# Patient Record
Sex: MALE | Race: WHITE | NOT HISPANIC OR LATINO | Employment: FULL TIME | URBAN - METROPOLITAN AREA
[De-identification: names, ages, dates, MRNs, and addresses within clinical notes are randomized per-mention and may not be internally consistent; named-entity substitution may affect disease eponyms.]

---

## 2024-05-28 ENCOUNTER — HOSPITAL ENCOUNTER (OUTPATIENT)
Dept: RADIOLOGY | Facility: HOSPITAL | Age: 54
Discharge: HOME | End: 2024-05-28
Attending: ORTHOPAEDIC SURGERY
Payer: COMMERCIAL

## 2024-05-28 VITALS
WEIGHT: 214.6 LBS | HEART RATE: 61 BPM | BODY MASS INDEX: 31.78 KG/M2 | SYSTOLIC BLOOD PRESSURE: 112 MMHG | DIASTOLIC BLOOD PRESSURE: 76 MMHG | HEIGHT: 69 IN

## 2024-05-28 DIAGNOSIS — M25.552 LEFT HIP PAIN: Primary | ICD-10-CM

## 2024-05-28 DIAGNOSIS — M25.552 LEFT HIP PAIN: ICD-10-CM

## 2024-05-28 DIAGNOSIS — M16.12 PRIMARY OSTEOARTHRITIS OF ONE HIP, LEFT: ICD-10-CM

## 2024-05-28 PROCEDURE — 99203 OFFICE O/P NEW LOW 30 MIN: CPT | Performed by: ORTHOPAEDIC SURGERY

## 2024-05-28 PROCEDURE — 73502 X-RAY EXAM HIP UNI 2-3 VIEWS: CPT

## 2024-05-28 RX ORDER — MELOXICAM 15 MG/1
15 TABLET ORAL DAILY
COMMUNITY
Start: 2024-01-15 | End: 2025-01-14

## 2024-05-28 RX ORDER — LEVOTHYROXINE SODIUM 175 UG/1
175 TABLET ORAL DAILY
COMMUNITY

## 2024-05-28 RX ORDER — METOPROLOL SUCCINATE 50 MG/1
TABLET, EXTENDED RELEASE ORAL
COMMUNITY
Start: 2024-04-02

## 2024-05-28 RX ORDER — ATORVASTATIN CALCIUM 10 MG/1
10 TABLET, FILM COATED ORAL
COMMUNITY
Start: 2024-01-09

## 2024-05-28 NOTE — PROGRESS NOTES
Assessment:   Diagnosis ICD-10-CM Associated Orders   1. Left hip pain  M25.552 XR hip/pelv 2-3 vws left if performed     FL injection left hip (non arthrogram)     Ambulatory referral to [facility] Pain [facility]      2. Primary osteoarthritis of one hip, left  M16.12 FL injection left hip (non arthrogram)     Ambulatory referral to Sp[facility] Pain [facility]          Plan:  A discussion was had with the patient that a god option for his hip pain may be to try a steroid injection.  However, this cannot be done in the office due to important neurovascular structures around the hip, so we will refer him for one.  After the injection, he should monitor the relief he gets.  If it helps, we know the pain is from his hip joint and a hip replacement would help his symptoms.  We could also do hip injections indefinitely for as long as they work well for him.    To do next visit:  Follow-up after the hip CSI.    The above stated was discussed in layman's terms and the patient expressed understanding.  All questions were answered to the patient's satisfaction.         Scribe Attestation      I,:  Chantell Mai PA-C am acting as a scribe while in the presence of the attending physician.:       I,:  Saleem Perdeu MD personally performed the services described in this documentation    as scribed in my presence.:             Subjective:   Dionicio Saravia is a 53 y.o. male who presents to the office today as a new patient for evaluation of left hip pain.  He has had hip pain for many years.  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  Sometimes the pain prevents him from sleeping.  He has had an MRI in the past in 2019 and was diagnosed with a small labral tear.  He saw a provider at [facility] who recommneded Meloxicam for 6 months, and now patient is looking for a second opinion.  He is fairly active.      Review of systems negative unless otherwise specified in  "HPI    History reviewed. No pertinent past medical history.    History reviewed. No pertinent surgical history.    History reviewed. No pertinent family history.    Social History     Occupational History    Not on file   Tobacco Use    Smoking status: Never    Smokeless tobacco: Never   Substance and Sexual Activity    Alcohol use: Not on file    Drug use: Not on file    Sexual activity: Not on file         Current Outpatient Medications:     atorvastatin (LIPITOR) 10 mg tablet, Take 10 mg by mouth, Disp: , Rfl:     levothyroxine 175 mcg tablet, Take 175 mcg by mouth daily, Disp: , Rfl:     meloxicam (MOBIC) 15 mg tablet, Take 15 mg by mouth daily, Disp: , Rfl:     metoprolol succinate (TOPROL-XL) 50 mg 24 hr tablet, TAKE 1 TABLET ONCE A DAY ALTERNATE WITH HALF TAB EVERY OTHER DAY, Disp: , Rfl:     No Known Allergies         Vitals:    05/28/24 0926   BP: 112/76   Pulse: 61       Objective:    General:  Patient is WDWN, alert and oriented, appears stated age, and is in no acute distress.    Musculoskeletal:    Left Hip:    Inspection:  No visible abnormality.    Range of Motion:  Left Hip ROM:  25 IR, 20 ER  Right Hip ROM:  40 IR, 50 ER   Normal hip flexion bilaterally        Diagnostics, reviewed and taken today if performed as documented:    The attending physician has personally reviewed the pertinent films in PACS and interpretation is as follows:  Left Hip X-rays:  The patient has moderate OA with subchondral cysts and bone spurring.  Acetabular cysts noted.  His femoral head is not perfectly spherical.      Procedures, if performed today:    None performed      Portions of the record may have been created with voice recognition software.  Occasional wrong word or \"sound a like\" substitutions may have occurred due to the inherent limitations of voice recognition software.  Read the chart carefully and recognize, using context, where substitutions have occurred.  "

## 2024-06-17 ENCOUNTER — CONSULT (OUTPATIENT)
Dept: PAIN MEDICINE | Facility: CLINIC | Age: 54
End: 2024-06-17
Payer: COMMERCIAL

## 2024-06-17 VITALS
BODY MASS INDEX: 30.96 KG/M2 | SYSTOLIC BLOOD PRESSURE: 176 MMHG | DIASTOLIC BLOOD PRESSURE: 94 MMHG | WEIGHT: 209 LBS | HEIGHT: 69 IN | HEART RATE: 73 BPM

## 2024-06-17 DIAGNOSIS — M16.12 PRIMARY OSTEOARTHRITIS OF ONE HIP, LEFT: Primary | ICD-10-CM

## 2024-06-17 DIAGNOSIS — M25.552 LEFT HIP PAIN: ICD-10-CM

## 2024-06-17 PROCEDURE — 99244 OFF/OP CNSLTJ NEW/EST MOD 40: CPT | Performed by: ANESTHESIOLOGY

## 2024-06-17 NOTE — PROGRESS NOTES
Assessment  1. Left hip pain    2. Primary osteoarthritis of one hip, left        Plan  53-year-old male, referred by Dr. Perdue of orthopedics, presenting for initial consultation regarding left hip and groin pain for the past 10 years which has progressively worsened.  X-ray of the left hip demonstrates OA.  He has done physical therapy in the past without much relief, however has found relief with chiropractic treatment.  However, this is transient.  He does take Tylenol and ibuprofen as needed with some relief.  Meloxicam was less effective than ibuprofen.    1.  I will schedule the patient for left intra-articular hip injection under fluoroscopic guidance  2.  Patient may take ibuprofen 600 mg every 8 hours as needed  3.  Patient may take Tylenol 500 to 1000 mg every 8 hours as needed  4.  Patient may continue with chiropractic treatment  5.  I will follow-up with the patient in 4 weeks      Complete risks and benefits including bleeding, infection, tissue reaction, nerve injury and allergic reaction were discussed. The approach was demonstrated using models and literature was provided. Verbal and written consent was obtained.    My impressions and treatment recommendations were discussed in detail with the patient who verbalized understanding and had no further questions.  Discharge instructions were provided. I personally saw and examined the patient and I agree with the above discussed plan of care.    No orders of the defined types were placed in this encounter.    No orders of the defined types were placed in this encounter.      History of Present Illness    Dionicio Saravia is a 53 y.o. male referred by Dr. Perdue of orthopedics, presenting for initial consultation regarding left hip and groin pain for the past 10 years which has progressively worsened.  He denies any numbness or paresthesias in the left leg.  He denies any back pain.  He does have some weakness of the left hip secondary to pain.  He denies any  specific trauma or inciting event, however the patient is very active and has played volleyball, ultimate Frisbee, and is an avid hiker which she feels has contributed to the onset and progression of his symptoms.  X-ray of the left hip demonstrates OA.  He has done physical therapy in the past without much relief, however has found relief with chiropractic treatment.  However, this is transient.  He does take Tylenol and ibuprofen as needed with some relief.  Meloxicam was less effective than ibuprofen.  The patient rates his pain a 1-4 out of 10 and the pain is intermittent.  The pain does not follow any particular pattern throughout the day.  The pain is described as sharp.  The pain is increased with standing, bending, walking, and exercise.  The pain is alleviated with chiropractic treatment.    Other than as stated above, the patient denies any interval changes in medications, medical condition, mental condition, symptoms, or allergies since the last office visit.    I have personally reviewed and/or updated the patient's past medical history, past surgical history, family history, social history, current medications, allergies, and vital signs today.     Review of Systems   Musculoskeletal:  Positive for arthralgias.   All other systems reviewed and are negative.      There is no problem list on file for this patient.      History reviewed. No pertinent past medical history.    History reviewed. No pertinent surgical history.    History reviewed. No pertinent family history.    Social History     Occupational History    Not on file   Tobacco Use    Smoking status: Never    Smokeless tobacco: Never   Substance and Sexual Activity    Alcohol use: Not on file    Drug use: Not on file    Sexual activity: Not on file       Current Outpatient Medications on File Prior to Visit   Medication Sig    atorvastatin (LIPITOR) 10 mg tablet Take 10 mg by mouth    levothyroxine 175 mcg tablet Take 175 mcg by mouth daily     "meloxicam (MOBIC) 15 mg tablet Take 15 mg by mouth daily    metoprolol succinate (TOPROL-XL) 50 mg 24 hr tablet TAKE 1 TABLET ONCE A DAY ALTERNATE WITH HALF TAB EVERY OTHER DAY     No current facility-administered medications on file prior to visit.       No Known Allergies    Physical Exam    BP (!) 176/94 Comment: pt to monitor and contact pcp  Pulse 73   Ht 5' 9\" (1.753 m)   Wt 94.8 kg (209 lb)   BMI 30.86 kg/m²     Constitutional: normal, well developed, well nourished, alert, in no distress and non-toxic and no overt pain behavior.  Eyes: anicteric  HEENT: grossly intact  Neck: supple, symmetric, trachea midline and no masses   Pulmonary:even and unlabored  Cardiovascular:No edema or pitting edema present  Skin:Normal without rashes or lesions and well hydrated  Psychiatric:Mood and affect appropriate  Neurologic:Cranial Nerves II-XII grossly intact  Musculoskeletal:normal gait.  Bilateral lumbar paraspinals and SI joints nontender to palpation.  Bilateral trochanteric flare is nontender to palpation.  Bilateral patellar and Achilles reflexes were 2 out of 4 and symmetric.  No clonus is noted bilaterally.  Bilateral lower extremity strength 5 out of 5 in all muscle groups with the exception of left iliopsoas which was 4-5.  Negative straight leg raise bilaterally.  Negative Andres's test bilaterally.  Limited range of motion with internal and external range of motion of left hip which also reproduces left hip and groin pain.  Positive Stinchfield test on the left.    Imaging  X-ray of the left hip demonstrates OA.  "

## 2024-07-09 ENCOUNTER — HOSPITAL ENCOUNTER (OUTPATIENT)
Dept: RADIOLOGY | Facility: CLINIC | Age: 54
Discharge: HOME | End: 2024-07-09
Payer: COMMERCIAL

## 2024-07-09 VITALS
DIASTOLIC BLOOD PRESSURE: 98 MMHG | OXYGEN SATURATION: 98 % | HEART RATE: 65 BPM | RESPIRATION RATE: 18 BRPM | SYSTOLIC BLOOD PRESSURE: 146 MMHG | TEMPERATURE: 97.8 F

## 2024-07-09 DIAGNOSIS — M25.552 LEFT HIP PAIN: ICD-10-CM

## 2024-07-09 DIAGNOSIS — M16.12 PRIMARY OSTEOARTHRITIS OF ONE HIP, LEFT: ICD-10-CM

## 2024-07-09 PROCEDURE — 20610 DRAIN/INJ JOINT/BURSA W/O US: CPT | Performed by: ANESTHESIOLOGY

## 2024-07-09 PROCEDURE — 77002 NEEDLE LOCALIZATION BY XRAY: CPT

## 2024-07-09 PROCEDURE — 77002 NEEDLE LOCALIZATION BY XRAY: CPT | Performed by: ANESTHESIOLOGY

## 2024-07-09 RX ORDER — METHYLPREDNISOLONE ACETATE 40 MG/ML
40 INJECTION, SUSPENSION INTRA-ARTICULAR; INTRALESIONAL; INTRAMUSCULAR; PARENTERAL; SOFT TISSUE ONCE
Status: COMPLETED | OUTPATIENT
Start: 2024-07-09 | End: 2024-07-09

## 2024-07-09 RX ORDER — LIDOCAINE HYDROCHLORIDE 10 MG/ML
5 INJECTION, SOLUTION EPIDURAL; INFILTRATION; INTRACAUDAL; PERINEURAL ONCE
Status: COMPLETED | OUTPATIENT
Start: 2024-07-09 | End: 2024-07-09

## 2024-07-09 RX ORDER — ROPIVACAINE HYDROCHLORIDE 2 MG/ML
3 INJECTION, SOLUTION EPIDURAL; INFILTRATION; PERINEURAL ONCE
Status: COMPLETED | OUTPATIENT
Start: 2024-07-09 | End: 2024-07-09

## 2024-07-09 RX ADMIN — METHYLPREDNISOLONE ACETATE 40 MG: 40 INJECTION, SUSPENSION INTRA-ARTICULAR; INTRALESIONAL; INTRAMUSCULAR; SOFT TISSUE at 15:30

## 2024-07-09 RX ADMIN — ROPIVACAINE HYDROCHLORIDE 3 ML: 2 INJECTION EPIDURAL; INFILTRATION at 15:30

## 2024-07-09 RX ADMIN — LIDOCAINE HYDROCHLORIDE 5 ML: 10 INJECTION, SOLUTION EPIDURAL; INFILTRATION; INTRACAUDAL; PERINEURAL at 15:30

## 2024-07-09 RX ADMIN — IOHEXOL 1 ML: 300 INJECTION, SOLUTION INTRAVENOUS at 15:30

## 2024-07-09 NOTE — H&P
History of Present Illness: The patient is a 53 y.o. male who presents with complaints of hip pain.    History reviewed. No pertinent past medical history.    History reviewed. No pertinent surgical history.      Current Outpatient Medications:     atorvastatin (LIPITOR) 10 mg tablet, Take 10 mg by mouth, Disp: , Rfl:     levothyroxine 175 mcg tablet, Take 175 mcg by mouth daily, Disp: , Rfl:     meloxicam (MOBIC) 15 mg tablet, Take 15 mg by mouth daily, Disp: , Rfl:     metoprolol succinate (TOPROL-XL) 50 mg 24 hr tablet, TAKE 1 TABLET ONCE A DAY ALTERNATE WITH HALF TAB EVERY OTHER DAY, Disp: , Rfl:     No Known Allergies    Physical Exam:   Vitals:    07/09/24 1506   Pulse: (!) 54   Resp: 18   Temp: 97.8 °F (36.6 °C)   SpO2: 98%     General: Awake, Alert, Oriented x 3, Mood and affect appropriate  Respiratory: Respirations even and unlabored  Cardiovascular: Peripheral pulses intact; no edema  Musculoskeletal Exam: Antalgic gait    ASA Score: 2         Assessment:   1. Left hip pain    2. Primary osteoarthritis of one hip, left        Plan: left hip injection

## 2024-07-09 NOTE — DISCHARGE INSTRUCTIONS

## 2024-07-23 ENCOUNTER — TELEPHONE (OUTPATIENT)
Dept: PAIN MEDICINE | Facility: CLINIC | Age: 54
End: 2024-07-23

## 2024-08-01 ENCOUNTER — TELEPHONE (OUTPATIENT)
Age: 54
End: 2024-08-01

## 2024-08-01 NOTE — TELEPHONE ENCOUNTER
Caller: Dionicio   Doctor/office: [facility]   CB#: [phone]    % of improvement: 70%  Pain Scale (1-10): 1-2/10